# Patient Record
Sex: MALE | NOT HISPANIC OR LATINO | URBAN - METROPOLITAN AREA
[De-identification: names, ages, dates, MRNs, and addresses within clinical notes are randomized per-mention and may not be internally consistent; named-entity substitution may affect disease eponyms.]

---

## 2018-03-06 ENCOUNTER — IMPORTED ENCOUNTER (OUTPATIENT)
Dept: URBAN - METROPOLITAN AREA CLINIC 1 | Facility: CLINIC | Age: 38
End: 2018-03-06

## 2018-03-06 PROBLEM — H16.002: Noted: 2018-03-06

## 2018-03-06 PROCEDURE — 92002 INTRM OPH EXAM NEW PATIENT: CPT

## 2018-03-06 NOTE — PATIENT DISCUSSION
1.  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 Begin Besivance Q1H OS and Erythromycin phil QHS OS. Return immediately if ulcer larger or symptoms worsen. No contact lens use. Return for an appointment in Thursday 10 with Dr. José Bruno.

## 2018-03-08 ENCOUNTER — IMPORTED ENCOUNTER (OUTPATIENT)
Dept: URBAN - METROPOLITAN AREA CLINIC 1 | Facility: CLINIC | Age: 38
End: 2018-03-08

## 2018-03-08 PROBLEM — H16.002: Noted: 2018-03-08

## 2018-03-08 PROCEDURE — 99213 OFFICE O/P EST LOW 20 MIN: CPT

## 2018-03-08 NOTE — PATIENT DISCUSSION
1.  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 Resolving. Decrease Besivance Q2H OS and cont Erythromycin phil QHS OS. Return immediately if ulcer larger or symptoms worsen. No contact lens use. Return for an appointment in Tuesday 10 with Dr. Isai Gloria.

## 2018-03-13 ENCOUNTER — IMPORTED ENCOUNTER (OUTPATIENT)
Dept: URBAN - METROPOLITAN AREA CLINIC 1 | Facility: CLINIC | Age: 38
End: 2018-03-13

## 2018-03-13 PROBLEM — H16.002: Noted: 2018-03-13

## 2018-03-13 PROCEDURE — 99213 OFFICE O/P EST LOW 20 MIN: CPT

## 2018-03-13 NOTE — PATIENT DISCUSSION
1.  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 Almost completely resolved. Decrease Besivance QID OS x 1 week then DC and DC Erythromycin phil QHS OS. Return immediately if ulcer larger or symptoms worsen. No contact lens use may resume after DC Besivance. Return for an appointment in 1-3 months 40/cc with Dr. Asa Riojas.

## 2018-04-12 ENCOUNTER — IMPORTED ENCOUNTER (OUTPATIENT)
Dept: URBAN - METROPOLITAN AREA CLINIC 1 | Facility: CLINIC | Age: 38
End: 2018-04-12

## 2018-04-12 PROCEDURE — 92012 INTRM OPH EXAM EST PATIENT: CPT

## 2018-04-19 ENCOUNTER — IMPORTED ENCOUNTER (OUTPATIENT)
Dept: URBAN - METROPOLITAN AREA CLINIC 1 | Facility: CLINIC | Age: 38
End: 2018-04-19

## 2018-04-19 PROBLEM — H10.32: Noted: 2018-04-19

## 2018-04-19 PROBLEM — H10.31: Noted: 2018-04-19

## 2018-04-19 PROCEDURE — 92012 INTRM OPH EXAM EST PATIENT: CPT

## 2018-04-19 NOTE — PATIENT DISCUSSION
1. Unspecified Acute Conjunctivitis OD secondary to gl CL Overwear- resolved. D/c Tobradex d/c Besivance D/c Emycin phil. Begin ATs QID OU Routinely (Sample of Systane Ultra given) Ok to resume CL Wear. 2. H/o CL Wear Return for an appointment in within next month 40/cc with Dr. Asa Riojas. Return for an appointment in as needed with Dr. Aroldo Long.

## 2018-04-26 ENCOUNTER — IMPORTED ENCOUNTER (OUTPATIENT)
Dept: URBAN - METROPOLITAN AREA CLINIC 1 | Facility: CLINIC | Age: 38
End: 2018-04-26

## 2018-04-26 PROBLEM — H52.13: Noted: 2018-04-26

## 2018-04-26 PROCEDURE — S0621 ROUTINE OPHTHALMOLOGICAL EXA: HCPCS

## 2018-04-26 NOTE — PATIENT DISCUSSION
1. Myopia: Rx was given for correction if indicated and requested. Finalized CTL RXConsider Daily Toric CTL wear in the future. Advised no CTL overwear Return for an appointment in 1 year 40/cc with Dr. Toni Saxena.

## 2019-05-09 ENCOUNTER — IMPORTED ENCOUNTER (OUTPATIENT)
Dept: URBAN - METROPOLITAN AREA CLINIC 1 | Facility: CLINIC | Age: 39
End: 2019-05-09

## 2019-05-09 PROBLEM — H52.13: Noted: 2019-05-09

## 2019-05-09 PROCEDURE — S0621 ROUTINE OPHTHALMOLOGICAL EXA: HCPCS

## 2019-05-09 NOTE — PATIENT DISCUSSION
1. Myopia: Rx was given for correction if indicated and requested. 2. Return for an appointment in 1 year for 40 and Contact lens check. with Dr. Tolu Dillard

## 2019-06-21 ENCOUNTER — IMPORTED ENCOUNTER (OUTPATIENT)
Dept: URBAN - METROPOLITAN AREA CLINIC 1 | Facility: CLINIC | Age: 39
End: 2019-06-21

## 2019-06-21 PROBLEM — H00.14: Noted: 2019-06-21

## 2019-06-21 PROCEDURE — 92012 INTRM OPH EXAM EST PATIENT: CPT

## 2019-06-21 NOTE — PATIENT DISCUSSION
1.  Medial BREANNA Chalazion -- Hot Moist Compresses TID x 5 minutes for 3 weeks. If without improvement discussed with patient possible Incision and Drainage procedure. Risks and benefits discussed with patient and patient states full understanding. Consider I&D w/o improvement in 2 weeks. Return for an appointment as needed w/o improvement with Dr. Phillip Estrada.

## 2020-06-22 ENCOUNTER — IMPORTED ENCOUNTER (OUTPATIENT)
Dept: URBAN - METROPOLITAN AREA CLINIC 1 | Facility: CLINIC | Age: 40
End: 2020-06-22

## 2020-06-22 PROBLEM — H52.13: Noted: 2020-06-22

## 2020-06-22 PROCEDURE — S0621 ROUTINE OPHTHALMOLOGICAL EXA: HCPCS

## 2020-06-22 NOTE — PATIENT DISCUSSION
Myopia: Rx was given for correction if indicated and requested. Return for an appointment in 1 year 40/ccwith Dr. Eleanor Camejo.

## 2020-07-28 NOTE — PATIENT DISCUSSION
Recommend Full face CO2 laser (discussed risks and benefits of sx. ..). Discussed Valtrex as pt has hx of coldsores.

## 2021-06-23 ENCOUNTER — IMPORTED ENCOUNTER (OUTPATIENT)
Dept: URBAN - METROPOLITAN AREA CLINIC 1 | Facility: CLINIC | Age: 41
End: 2021-06-23

## 2021-06-23 PROBLEM — H52.13: Noted: 2021-06-23

## 2021-06-23 PROBLEM — H52.223: Noted: 2021-06-23

## 2021-06-23 PROCEDURE — S0621 ROUTINE OPHTHALMOLOGICAL EXA: HCPCS

## 2021-06-23 NOTE — PATIENT DISCUSSION
1. Myopia with Astigmatism OU - Rx was given for correction if indicated and requested. 2. Anterior Blepharitis OU - Recommend warm compresses. CTL RX finalized and given to patient today. Return for an appointment in 1 year 40/cc with Dr. Malu Angelo.

## 2022-04-02 ASSESSMENT — VISUAL ACUITY
OD_SC: 20/20
OS_SC: 20/20
OD_SC: 20/20
OS_SC: 20/25
OS_SC: 20/20
OD_SC: 20/20
OS_CC: J1
OS_SC: 20/20
OD_SC: 20/20
OD_SC: 20/20
OS_SC: 20/20
OD_SC: 20/20
OS_SC: 20/20
OD_SC: 20/20
OS_SC: 20/20
OD_CC: J1

## 2022-04-02 ASSESSMENT — TONOMETRY
OD_IOP_MMHG: 16
OS_IOP_MMHG: 17
OD_IOP_MMHG: 18
OD_IOP_MMHG: 16
OS_IOP_MMHG: 18
OS_IOP_MMHG: 18
OS_IOP_MMHG: 20
OD_IOP_MMHG: 20
OD_IOP_MMHG: 15
OS_IOP_MMHG: 17
OS_IOP_MMHG: 17
OD_IOP_MMHG: 16
OS_IOP_MMHG: 17
OD_IOP_MMHG: 17

## 2022-04-02 ASSESSMENT — KERATOMETRY
OS_K2POWER_DIOPTERS: 45.00
OS_AXISANGLE2_DEGREES: 076
OS_K1POWER_DIOPTERS: 43.50
OD_AXISANGLE_DEGREES: 004
OD_K2POWER_DIOPTERS: 45.00
OS_AXISANGLE_DEGREES: 166
OD_AXISANGLE2_DEGREES: 094
OD_K1POWER_DIOPTERS: 43.00

## 2022-07-14 ENCOUNTER — COMPREHENSIVE EXAM (OUTPATIENT)
Dept: URBAN - METROPOLITAN AREA CLINIC 1 | Facility: CLINIC | Age: 42
End: 2022-07-14

## 2022-07-14 DIAGNOSIS — H52.13: ICD-10-CM

## 2022-07-14 DIAGNOSIS — H52.223: ICD-10-CM

## 2022-07-14 PROCEDURE — 92310 CONTACT LENS FITTING OU: CPT

## 2022-07-14 PROCEDURE — 92015 DETERMINE REFRACTIVE STATE: CPT

## 2022-07-14 PROCEDURE — 92014 COMPRE OPH EXAM EST PT 1/>: CPT

## 2022-07-14 ASSESSMENT — VISUAL ACUITY
OS_CC: 20/20 -1
OD_CC: J1+
OS_CC: J1+
OD_CC: 20/20

## 2022-07-14 ASSESSMENT — KERATOMETRY
OD_AXISANGLE2_DEGREES: 094
OS_K1POWER_DIOPTERS: 43.50
OS_AXISANGLE_DEGREES: 166
OD_K1POWER_DIOPTERS: 43.00
OS_K2POWER_DIOPTERS: 45.00
OD_K2POWER_DIOPTERS: 45.00
OD_AXISANGLE_DEGREES: 004
OS_AXISANGLE2_DEGREES: 076

## 2022-07-14 ASSESSMENT — TONOMETRY
OD_IOP_MMHG: 16
OS_IOP_MMHG: 16

## 2023-07-14 ENCOUNTER — COMPREHENSIVE EXAM (OUTPATIENT)
Dept: URBAN - METROPOLITAN AREA CLINIC 1 | Facility: CLINIC | Age: 43
End: 2023-07-14

## 2023-07-14 DIAGNOSIS — Z46.0: ICD-10-CM

## 2023-07-14 DIAGNOSIS — Z01.00: ICD-10-CM

## 2023-07-14 PROCEDURE — 92310-3 LEVEL 3 CONTACT LENS MANAGEMENT

## 2023-07-14 PROCEDURE — 92015 DETERMINE REFRACTIVE STATE: CPT

## 2023-07-14 PROCEDURE — 92014 COMPRE OPH EXAM EST PT 1/>: CPT

## 2023-07-14 ASSESSMENT — KERATOMETRY
OD_K1POWER_DIOPTERS: 43.00
OS_AXISANGLE_DEGREES: 162
OD_AXISANGLE_DEGREES: 001
OS_K1POWER_DIOPTERS: 43.75
OD_AXISANGLE2_DEGREES: 91
OS_AXISANGLE2_DEGREES: 72
OS_K2POWER_DIOPTERS: 45.00
OD_K2POWER_DIOPTERS: 45.50

## 2023-07-14 ASSESSMENT — VISUAL ACUITY
OD_CC: 20/20
OS_CC: 20/20

## 2023-07-14 ASSESSMENT — TONOMETRY
OS_IOP_MMHG: 15
OD_IOP_MMHG: 14

## 2024-08-20 ENCOUNTER — COMPREHENSIVE EXAM (OUTPATIENT)
Dept: URBAN - METROPOLITAN AREA CLINIC 1 | Facility: CLINIC | Age: 44
End: 2024-08-20

## 2024-08-20 DIAGNOSIS — Z46.0: ICD-10-CM

## 2024-08-20 DIAGNOSIS — Z01.00: ICD-10-CM

## 2024-08-20 DIAGNOSIS — H52.13: ICD-10-CM

## 2024-08-20 DIAGNOSIS — H52.223: ICD-10-CM

## 2024-08-20 PROCEDURE — 92015 DETERMINE REFRACTIVE STATE: CPT

## 2024-08-20 PROCEDURE — 92014 COMPRE OPH EXAM EST PT 1/>: CPT

## 2024-08-20 PROCEDURE — 92310-3 LEVEL 3 CONTACT LENS MANAGEMENT

## 2024-08-20 ASSESSMENT — KERATOMETRY
OD_K1POWER_DIOPTERS: 43.00
OS_K1POWER_DIOPTERS: 43.75
OD_K2POWER_DIOPTERS: 45.50
OS_AXISANGLE_DEGREES: 162
OD_AXISANGLE_DEGREES: 001
OS_K2POWER_DIOPTERS: 45.00
OS_AXISANGLE2_DEGREES: 72
OD_AXISANGLE2_DEGREES: 91

## 2024-08-20 ASSESSMENT — VISUAL ACUITY
OU_CC: 20/20
OD_CC: 20/20
OS_CC: 20/20

## 2024-08-20 ASSESSMENT — TONOMETRY
OS_IOP_MMHG: 13
OD_IOP_MMHG: 13